# Patient Record
Sex: FEMALE | Race: WHITE | Employment: UNEMPLOYED | URBAN - METROPOLITAN AREA
[De-identification: names, ages, dates, MRNs, and addresses within clinical notes are randomized per-mention and may not be internally consistent; named-entity substitution may affect disease eponyms.]

---

## 2017-03-30 LAB
ANTIBODY SCREEN, EXTERNAL: NEGATIVE
GRBS, EXTERNAL: POSITIVE
HBSAG, EXTERNAL: NEGATIVE
HCT, EXTERNAL: 42.1
HGB, EXTERNAL: 13.8
HIV, EXTERNAL: NEGATIVE
RUBELLA, EXTERNAL: NORMAL
T. PALLIDUM, EXTERNAL: NORMAL

## 2017-07-20 LAB — GTT, 1 HR, GLUCOLA, EXTERNAL: 143

## 2017-10-16 ENCOUNTER — HOSPITAL ENCOUNTER (INPATIENT)
Age: 37
LOS: 2 days | Discharge: HOME | End: 2017-10-18
Attending: OBSTETRICS & GYNECOLOGY | Admitting: OBSTETRICS & GYNECOLOGY
Payer: COMMERCIAL

## 2017-10-16 ENCOUNTER — ANESTHESIA (OUTPATIENT)
Dept: LABOR AND DELIVERY | Age: 37
End: 2017-10-16
Payer: COMMERCIAL

## 2017-10-16 ENCOUNTER — ANESTHESIA EVENT (OUTPATIENT)
Dept: LABOR AND DELIVERY | Age: 37
End: 2017-10-16
Payer: COMMERCIAL

## 2017-10-16 LAB
ABO + RH BLD: NORMAL
BASOPHILS # BLD: 0 K/UL (ref 0–0.1)
BASOPHILS NFR BLD: 0 % (ref 0–1)
BLOOD GROUP ANTIBODIES SERPL: NORMAL
EOSINOPHIL # BLD: 0 K/UL (ref 0–0.4)
EOSINOPHIL NFR BLD: 0 % (ref 0–7)
ERYTHROCYTE [DISTWIDTH] IN BLOOD BY AUTOMATED COUNT: 13.5 % (ref 11.5–14.5)
HCT VFR BLD AUTO: 35.5 % (ref 35–47)
HGB BLD-MCNC: 11.5 G/DL (ref 11.5–16)
LYMPHOCYTES # BLD: 1.1 K/UL (ref 0.8–3.5)
LYMPHOCYTES NFR BLD: 13 % (ref 12–49)
MCH RBC QN AUTO: 30.1 PG (ref 26–34)
MCHC RBC AUTO-ENTMCNC: 32.4 G/DL (ref 30–36.5)
MCV RBC AUTO: 92.9 FL (ref 80–99)
MONOCYTES # BLD: 0.7 K/UL (ref 0–1)
MONOCYTES NFR BLD: 8 % (ref 5–13)
NEUTS SEG # BLD: 6.6 K/UL (ref 1.8–8)
NEUTS SEG NFR BLD: 79 % (ref 32–75)
PLATELET # BLD AUTO: 187 K/UL (ref 150–400)
RBC # BLD AUTO: 3.82 M/UL (ref 3.8–5.2)
SPECIMEN EXP DATE BLD: NORMAL
WBC # BLD AUTO: 8.3 K/UL (ref 3.6–11)

## 2017-10-16 PROCEDURE — 74011250636 HC RX REV CODE- 250/636: Performed by: OBSTETRICS & GYNECOLOGY

## 2017-10-16 PROCEDURE — 74011000250 HC RX REV CODE- 250

## 2017-10-16 PROCEDURE — 77030011640 HC PAD GRND REM COVD -A

## 2017-10-16 PROCEDURE — 77010026065 HC OXYGEN MINIMUM MEDICAL AIR: Performed by: OBSTETRICS & GYNECOLOGY

## 2017-10-16 PROCEDURE — 88307 TISSUE EXAM BY PATHOLOGIST: CPT | Performed by: OBSTETRICS & GYNECOLOGY

## 2017-10-16 PROCEDURE — 36415 COLL VENOUS BLD VENIPUNCTURE: CPT | Performed by: OBSTETRICS & GYNECOLOGY

## 2017-10-16 PROCEDURE — 86900 BLOOD TYPING SEROLOGIC ABO: CPT | Performed by: OBSTETRICS & GYNECOLOGY

## 2017-10-16 PROCEDURE — 77030007866 HC KT SPN ANES BBMI -B: Performed by: ANESTHESIOLOGY

## 2017-10-16 PROCEDURE — C1765 ADHESION BARRIER: HCPCS

## 2017-10-16 PROCEDURE — 76060000078 HC EPIDURAL ANESTHESIA: Performed by: OBSTETRICS & GYNECOLOGY

## 2017-10-16 PROCEDURE — 75410000003 HC RECOV DEL/VAG/CSECN EA 0.5 HR: Performed by: OBSTETRICS & GYNECOLOGY

## 2017-10-16 PROCEDURE — 77030018836 HC SOL IRR NACL ICUM -A

## 2017-10-16 PROCEDURE — 76010000391 HC C SECN FIRST 1 HR: Performed by: OBSTETRICS & GYNECOLOGY

## 2017-10-16 PROCEDURE — 77030034850

## 2017-10-16 PROCEDURE — 77030032490 HC SLV COMPR SCD KNE COVD -B

## 2017-10-16 PROCEDURE — 85025 COMPLETE CBC W/AUTO DIFF WBC: CPT | Performed by: OBSTETRICS & GYNECOLOGY

## 2017-10-16 PROCEDURE — 74011250636 HC RX REV CODE- 250/636

## 2017-10-16 PROCEDURE — 65270000029 HC RM PRIVATE

## 2017-10-16 PROCEDURE — 76010000392 HC C SECN EA ADDL 0.5 HR: Performed by: OBSTETRICS & GYNECOLOGY

## 2017-10-16 PROCEDURE — 74011250636 HC RX REV CODE- 250/636: Performed by: ANESTHESIOLOGY

## 2017-10-16 PROCEDURE — 77030010507 HC ADH SKN DERMBND J&J -B

## 2017-10-16 RX ORDER — SODIUM CHLORIDE, SODIUM LACTATE, POTASSIUM CHLORIDE, CALCIUM CHLORIDE 600; 310; 30; 20 MG/100ML; MG/100ML; MG/100ML; MG/100ML
1000 INJECTION, SOLUTION INTRAVENOUS CONTINUOUS
Status: DISCONTINUED | OUTPATIENT
Start: 2017-10-16 | End: 2017-10-16 | Stop reason: HOSPADM

## 2017-10-16 RX ORDER — IBUPROFEN 800 MG/1
800 TABLET ORAL EVERY 8 HOURS
Status: DISCONTINUED | OUTPATIENT
Start: 2017-10-16 | End: 2017-10-18 | Stop reason: HOSPADM

## 2017-10-16 RX ORDER — DIPHENHYDRAMINE HYDROCHLORIDE 50 MG/ML
12.5 INJECTION, SOLUTION INTRAMUSCULAR; INTRAVENOUS
Status: ACTIVE | OUTPATIENT
Start: 2017-10-16 | End: 2017-10-17

## 2017-10-16 RX ORDER — CEFAZOLIN SODIUM IN 0.9 % NACL 2 G/50 ML
2 INTRAVENOUS SOLUTION, PIGGYBACK (ML) INTRAVENOUS ONCE
Status: COMPLETED | OUTPATIENT
Start: 2017-10-16 | End: 2017-10-16

## 2017-10-16 RX ORDER — SIMETHICONE 80 MG
80 TABLET,CHEWABLE ORAL AS NEEDED
Status: DISCONTINUED | OUTPATIENT
Start: 2017-10-16 | End: 2017-10-18 | Stop reason: HOSPADM

## 2017-10-16 RX ORDER — OXYCODONE HYDROCHLORIDE 5 MG/1
5 TABLET ORAL
Status: ACTIVE | OUTPATIENT
Start: 2017-10-16 | End: 2017-10-17

## 2017-10-16 RX ORDER — SODIUM CHLORIDE, SODIUM LACTATE, POTASSIUM CHLORIDE, CALCIUM CHLORIDE 600; 310; 30; 20 MG/100ML; MG/100ML; MG/100ML; MG/100ML
125 INJECTION, SOLUTION INTRAVENOUS CONTINUOUS
Status: DISCONTINUED | OUTPATIENT
Start: 2017-10-16 | End: 2017-10-18 | Stop reason: HOSPADM

## 2017-10-16 RX ORDER — SODIUM CHLORIDE 0.9 % (FLUSH) 0.9 %
5-10 SYRINGE (ML) INJECTION AS NEEDED
Status: DISCONTINUED | OUTPATIENT
Start: 2017-10-16 | End: 2017-10-18 | Stop reason: HOSPADM

## 2017-10-16 RX ORDER — ZOLPIDEM TARTRATE 5 MG/1
5 TABLET ORAL
Status: DISCONTINUED | OUTPATIENT
Start: 2017-10-16 | End: 2017-10-18 | Stop reason: HOSPADM

## 2017-10-16 RX ORDER — OXYTOCIN/RINGER'S LACTATE 20/1000 ML
125-500 PLASTIC BAG, INJECTION (ML) INTRAVENOUS ONCE
Status: ACTIVE | OUTPATIENT
Start: 2017-10-16 | End: 2017-10-17

## 2017-10-16 RX ORDER — SODIUM CHLORIDE, SODIUM LACTATE, POTASSIUM CHLORIDE, CALCIUM CHLORIDE 600; 310; 30; 20 MG/100ML; MG/100ML; MG/100ML; MG/100ML
INJECTION, SOLUTION INTRAVENOUS
Status: DISCONTINUED | OUTPATIENT
Start: 2017-10-16 | End: 2017-10-16 | Stop reason: HOSPADM

## 2017-10-16 RX ORDER — KETOROLAC TROMETHAMINE 30 MG/ML
30 INJECTION, SOLUTION INTRAMUSCULAR; INTRAVENOUS
Status: DISCONTINUED | OUTPATIENT
Start: 2017-10-16 | End: 2017-10-16 | Stop reason: CLARIF

## 2017-10-16 RX ORDER — SODIUM CHLORIDE 0.9 % (FLUSH) 0.9 %
5-10 SYRINGE (ML) INJECTION EVERY 8 HOURS
Status: DISCONTINUED | OUTPATIENT
Start: 2017-10-16 | End: 2017-10-16 | Stop reason: HOSPADM

## 2017-10-16 RX ORDER — NALOXONE HYDROCHLORIDE 0.4 MG/ML
0.1 INJECTION, SOLUTION INTRAMUSCULAR; INTRAVENOUS; SUBCUTANEOUS
Status: DISCONTINUED | OUTPATIENT
Start: 2017-10-16 | End: 2017-10-18 | Stop reason: HOSPADM

## 2017-10-16 RX ORDER — OXYCODONE HYDROCHLORIDE 5 MG/1
10 TABLET ORAL
Status: ACTIVE | OUTPATIENT
Start: 2017-10-16 | End: 2017-10-17

## 2017-10-16 RX ORDER — MORPHINE SULFATE 0.5 MG/ML
INJECTION, SOLUTION EPIDURAL; INTRATHECAL; INTRAVENOUS AS NEEDED
Status: DISCONTINUED | OUTPATIENT
Start: 2017-10-16 | End: 2017-10-16 | Stop reason: HOSPADM

## 2017-10-16 RX ORDER — BISACODYL 5 MG
5 TABLET, DELAYED RELEASE (ENTERIC COATED) ORAL DAILY PRN
Status: DISCONTINUED | OUTPATIENT
Start: 2017-10-16 | End: 2017-10-18 | Stop reason: HOSPADM

## 2017-10-16 RX ORDER — HYDROCODONE BITARTRATE AND ACETAMINOPHEN 5; 325 MG/1; MG/1
1 TABLET ORAL
Status: DISCONTINUED | OUTPATIENT
Start: 2017-10-16 | End: 2017-10-16

## 2017-10-16 RX ORDER — SODIUM CHLORIDE 0.9 % (FLUSH) 0.9 %
5-10 SYRINGE (ML) INJECTION EVERY 8 HOURS
Status: DISCONTINUED | OUTPATIENT
Start: 2017-10-16 | End: 2017-10-18 | Stop reason: HOSPADM

## 2017-10-16 RX ORDER — OXYTOCIN 10 [USP'U]/ML
INJECTION, SOLUTION INTRAMUSCULAR; INTRAVENOUS AS NEEDED
Status: DISCONTINUED | OUTPATIENT
Start: 2017-10-16 | End: 2017-10-16 | Stop reason: HOSPADM

## 2017-10-16 RX ORDER — FENTANYL CITRATE 50 UG/ML
INJECTION, SOLUTION INTRAMUSCULAR; INTRAVENOUS AS NEEDED
Status: DISCONTINUED | OUTPATIENT
Start: 2017-10-16 | End: 2017-10-16 | Stop reason: HOSPADM

## 2017-10-16 RX ORDER — ONDANSETRON 2 MG/ML
INJECTION INTRAMUSCULAR; INTRAVENOUS AS NEEDED
Status: DISCONTINUED | OUTPATIENT
Start: 2017-10-16 | End: 2017-10-16 | Stop reason: HOSPADM

## 2017-10-16 RX ORDER — KETOROLAC TROMETHAMINE 30 MG/ML
30 INJECTION, SOLUTION INTRAMUSCULAR; INTRAVENOUS
Status: DISPENSED | OUTPATIENT
Start: 2017-10-16 | End: 2017-10-17

## 2017-10-16 RX ORDER — NALOXONE HYDROCHLORIDE 0.4 MG/ML
0.4 INJECTION, SOLUTION INTRAMUSCULAR; INTRAVENOUS; SUBCUTANEOUS AS NEEDED
Status: DISCONTINUED | OUTPATIENT
Start: 2017-10-16 | End: 2017-10-18 | Stop reason: HOSPADM

## 2017-10-16 RX ORDER — BUPIVACAINE HYDROCHLORIDE 7.5 MG/ML
INJECTION, SOLUTION EPIDURAL; RETROBULBAR AS NEEDED
Status: DISCONTINUED | OUTPATIENT
Start: 2017-10-16 | End: 2017-10-16 | Stop reason: HOSPADM

## 2017-10-16 RX ORDER — SODIUM CHLORIDE 0.9 % (FLUSH) 0.9 %
5-10 SYRINGE (ML) INJECTION AS NEEDED
Status: DISCONTINUED | OUTPATIENT
Start: 2017-10-16 | End: 2017-10-16 | Stop reason: HOSPADM

## 2017-10-16 RX ADMIN — SODIUM CHLORIDE, SODIUM LACTATE, POTASSIUM CHLORIDE, AND CALCIUM CHLORIDE 1000 ML: 600; 310; 30; 20 INJECTION, SOLUTION INTRAVENOUS at 12:16

## 2017-10-16 RX ADMIN — SODIUM CHLORIDE, SODIUM LACTATE, POTASSIUM CHLORIDE, AND CALCIUM CHLORIDE 125 ML/HR: 600; 310; 30; 20 INJECTION, SOLUTION INTRAVENOUS at 17:16

## 2017-10-16 RX ADMIN — OXYTOCIN 40 UNITS: 10 INJECTION, SOLUTION INTRAMUSCULAR; INTRAVENOUS at 14:59

## 2017-10-16 RX ADMIN — SODIUM CHLORIDE, SODIUM LACTATE, POTASSIUM CHLORIDE, CALCIUM CHLORIDE: 600; 310; 30; 20 INJECTION, SOLUTION INTRAVENOUS at 14:59

## 2017-10-16 RX ADMIN — ONDANSETRON 4 MG: 2 INJECTION INTRAMUSCULAR; INTRAVENOUS at 15:07

## 2017-10-16 RX ADMIN — CEFAZOLIN 2 G: 1 INJECTION, POWDER, FOR SOLUTION INTRAMUSCULAR; INTRAVENOUS; PARENTERAL at 13:18

## 2017-10-16 RX ADMIN — SODIUM CHLORIDE, SODIUM LACTATE, POTASSIUM CHLORIDE, AND CALCIUM CHLORIDE 1000 ML: 600; 310; 30; 20 INJECTION, SOLUTION INTRAVENOUS at 13:19

## 2017-10-16 RX ADMIN — KETOROLAC TROMETHAMINE 30 MG: 30 INJECTION, SOLUTION INTRAMUSCULAR at 17:15

## 2017-10-16 RX ADMIN — FENTANYL CITRATE 10 MCG: 50 INJECTION, SOLUTION INTRAMUSCULAR; INTRAVENOUS at 14:39

## 2017-10-16 RX ADMIN — BUPIVACAINE HYDROCHLORIDE 1.4 ML: 7.5 INJECTION, SOLUTION EPIDURAL; RETROBULBAR at 14:39

## 2017-10-16 RX ADMIN — MORPHINE SULFATE 200 MCG: 0.5 INJECTION, SOLUTION EPIDURAL; INTRATHECAL; INTRAVENOUS at 14:39

## 2017-10-16 NOTE — LACTATION NOTE
This note was copied from a baby's chart. Discussed with mother her plan for feeding. Reviewed the benefits of exclusive breast milk feeding during the hospital stay. Informed her of the risks of using formula to supplement in the first few days of life as well as the benefits of successful breast milk feeding; referred her to the Breastfeeding booklet about this information. She acknowledges understanding of information reviewed and states that it is her plan tobreast feedher infant. Will support her choice and offer additional information as needed. Reviewed breastfeeding basics:  How milk is made and normal  breastfeeding behaviors discussed. Supply and demand,  stomach size, early feeding cues, skin to skin bonding with comfortable positioning and baby led latch-on reviewed. How to identify signs of successful breastfeeding sessions reviewed; education on assymetrical latch, signs of effective latching vs shallow, in-effective latching, normal  feeding frequency and duration and expected infant output discussed. Normal course of breastfeeding discussed including the AAP's recommendation that children receive exclusive breast milk feedings for the first six months of life with breast milk feedings to continue through the first year of life and/or beyond as complimentary table foods are added. Breastfeeding Booklet and Warm line information provided with discussion. Discussed typical  weight loss and the importance of pediatrician appointment within 24-48 hours of discharge, at 2 weeks of life and normalcy of requesting pediatric weight checks as needed in between visits. Pt will successfully establish breastfeeding by feeding in response to early feeding cues   or wake every 3h, will obtain deep latch, and will keep log of feedings/output. Taught to BF at hunger cues and or q 2-3 hrs and to offer 10-20 drops of hand expressed colostrum at any non-feeds.       Breast Assessment  Left Breast: Medium, Thick Aereolae, Tight  Left Nipple: Everted, Intact  Right Breast: Medium, Large  Right Nipple: Everted, Intact  Breast- Feeding Assessment  Attends Breast-Feeding Classes: No  Breast-Feeding Experience: Yes  Breast Trauma/Surgery: No     Mother/Infant Observation  Mother Observation: Alignment, Breast comfortable  Infant Observation: Lips flanged, lower, Latches nipple and aereolae, Rhythmic suck, Relaxed after feeding, Opens mouth, Lips flanged, upper  LATCH Documentation  Latch: Repeated attempts, hold nipple in mouth, stimulate to suck  Audible Swallowing: A few with stimulation  Type of Nipple: Everted (after stimulation)  Comfort (Breast/Nipple): Soft/non-tender  Hold (Positioning): No assist from staff, mother able to position/hold infant  LATCH Score: 8

## 2017-10-16 NOTE — DISCHARGE INSTRUCTIONS
Discharge Instructions for  Section    Patient ID:  Annabelle Vega  [id number]  30 y.o.  1980    Take Home Medications     Percocet-Take 1 tablet by mouth every 4 hours as needed for pain. Continue taking your prenatal vitamins if you are breastfeeding. Follow-up Appointment:  Follow-up with [facility] in 2 weeks. Follow-up care is a key part of your treatment and safety. Be sure to make and go to all appointments, and call your doctor if you are having problems. Its also a good idea to know your test results and keep a list of the medicines you take. Activity  Avoid lifting more than 10 lbs for 6 weeks after your delivery. Don't put anything in your vagina for 6 weeks (no intercourse, tampons, or douching). Limit climbing stairs to twice a day, and please hold a railing. Have someone else carry your baby up stairs initially, as you may be a bit unsteady. No driving for about 2 weeks or until your are easily able to turn your body and move your foot from the gas to the brake pedal without pain- you need to be able to move quickly enough to respond to traffic. You cannot drive while you are taking narcotics (prescription pain medications). You may shower but do not take a bath for 2 weeks. Be sure to dry your incision well after your shower. You may gradually work up to light exercise such as brisk walking over the next few weeks, but take it easy- you'll be tired and sore! You need to wait 4-6 weeks before starting vigorous exercise such as aerobics, running, weight-lifting, sit-ups, etc.- clear this with your doctor at your postpartum check-up. Although it's important to limit certain activities and avoid \"over-doing it\", walking is good for you and will actually speed the healing process. Walking increases the blood flow to your legs, decreasing the risk of blood clots, and also encourages the bowels to speed up, decreasing constipation, bloating, and gassiness.   Don't stay in bed at home, get up and move around the house. You'll feel better more quickly. Diet  You may eat a regular diet but you may want to avoid heavy, greasy or spicy foods and other foods that could increase constipation and gas. Wound care  Your incision may have been closed with conventional (metal) staples, absorbable (dissolving) staples, or absorbable sutures. If you still have staples in your incision when you leave the hospital, call your doctor's office as instructed to schedule an appointment to have them removed (usually in about a week). Otherwise, there may be some small tapes over your incision called Steri-strips. Please remove these in about a week. There also may be Dermabond glue over your incision which looks like a clear, shiny coating (as if it were painted with clear fingernail polish). This will gradually peel off over the following weeks, do not remove it. It is normal to have a small amount of clear or reddish drainage from your incision. It's best to leave it open to the air, but if there is drainage, you may cover the incision lightly with gauze, preferably without tape. Keep the incision as dry as possible. You may even consider drying the incision with a cool hair dryer after you shower. Numbness of the skin at or around your incision is normal and the feeling usually returns gradually. Call your doctor if you have a lot of drainage from your incision, an unpleasant odor, red streaks, an increase in pain, or the incision appears to open up. Pain Management  You were probably given a prescription for pain medication, similar to the one you've been taking while in the hospital.  In addition to this, you can take over-the-counter pain medicines like Advil or Motrin (ibuprofen). You can take both medications together, alternating doses every few hours.   You will get the most relief if you take the maximum dose:  Motrin or Advil (generic ibuprofen) 800 mg every 8 hours (4 tablets or capsules every 8 hours). The prescription you were given probably contains a narcotic mixed with Tylenol, therefore, you should not take any extra Tylenol or acetominophen, or you could be getting more than the safe amount. If you feel you don't need the prescription pain medication, you may take Tylenol instead, along with ibuprofen. The maximum dose is Tylenol or acetominophen 1000 mg every 6 hours (equivalent to 2 Extra Strength Tylenols every 6 hours). After a few days, you should reduce the amount of prescription pain medication you are taking. At that point, try to use ibuprofen as the main medication, and take the prescription medication if needed for more severe pain not relieved by the ibuprofen. Your goal should be to take only the minimum necessary amounts of the prescription medication (narcotic), as these pain medicines can be addictive and will worsen or cause constipation. Most patients will find that within a few days, their pain is adequately controlled using only over-the-counter medications and minimal doses of prescription pain medicine. A heating pad can also be very helpful for cramping or back pain. Sitz baths are helpful for pain associated with hemorrhoids. You can use either a sitz bath basin or a bathtub filled with 2-3\" inches of plain warm water. Soak for 10 minutes 3 times a day. Over-the-counter hemorrhoid wipes and ointments are fine to use as well. Constipation  You may find that bowel movements are irregular after delivery and that you have a tendency to be constipated, especially after surgery. A stool softener such as Colace (docusate) can safely be taken daily if needed. If you become constipated you can use a laxative such as Dulcolax, Miralax or Milk of Magnesia. Don't wait until constipation is severe- take something sooner rather than later and you will feel much better! Suppositories such as Dulcolax or Fleet's Enemas are options too.     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 Try to rest when you can and don't worry about doing housework or other tasks which can wait. The soreness in your incision will improve significantly over the first 2 weeks or so, but it may take 6-8 weeks before you are completely recovered. You are likely to have some back pain or general body aches or muscle soreness. This should improve with acetominophen or ibuprofen. If your legs were swollen during your pregnancy or as a result of IV fluids given during your hospital stay, this should go away in a few days to a week. Most women experience some form of the \"Baby Blues\" after having a baby. This means that you may feel emotional, tearful, frustrated, anxious, sad, and irritable some of the time. This is normal if it's not severe and should go away after about 2 weeks. Getting as much rest as you can will help. Accept assistance from friends and family members so that you can take breaks from caring for the baby. Call your doctor if your symptoms seem severe, last more than 2 weeks, or seem to be getting worse instead of better. Get help immediately if you have thoughts of wanting to hurt yourself or others! When should you call for help? Call 911 anytime you think you may need emergency care. For example, call if:  You pass out (lose consciousness). You have sudden chest pain and shortness of breath, or you cough up blood. You have severe pain in your belly. Call your doctor now or seek immediate medical care if:  You have heavy vaginal bleeding that soaks one or more pads in an hour, or you have large clots (golf ball size or larger). Your have foul-smelling discharge from your vagina or incision. You are sick to your stomach or cannot keep fluids down.   You have pain that does not get better after you take pain medicine. You have signs of infection, such as: Increased pain in your abdomen or vaginal area. Red streaks, warmth, or tenderness of your breasts or the area around your incision. A fever of 101 or greater (taken by mouth). You have signs of a blood clot, such as:  Pain in your calf, back of knee, thigh, or groin. Redness and swelling in your leg or groin. You have trouble passing urine or stool, especially if you have pain or swelling in your lower belly; or if you are unable to have a bowel movement after taking a laxative. You have a fast or pounding heartbeat. MyChart Activation    Thank you for requesting access to [facility]. Please follow the instructions below to securely access and download your online medical record. [facility] allows you to send messages to your doctor, view your test results, renew your prescriptions, schedule appointments, and more. How Do I Sign Up? 1. In your internet browser, go to [email or web address]  2. Click on the First Time User? Click Here link in the Sign In box. You will be redirect to the New Member Sign Up page. 3. Enter your [facility] Access Code exactly as it appears below. You will not need to use this code after youve completed the sign-up process. If you do not sign up before the expiration date, you must request a new code. [facility] Access Code: [email or web address]  Expires: 2018 10:26 AM (This is the date your [employer] access code will )    4. Enter the last four digits of your Social Security Number (xxxx) and Date of Birth (mm/dd/yyyy) as indicated and click Submit. You will be taken to the next sign-up page. 5. Create a [facility] ID. This will be your [facility] login ID and cannot be changed, so think of one that is secure and easy to remember. 6. Create a [facility] password. You can change your password at any time. 7. Enter your Password Reset Question and Answer. This can be used at a later time if you forget your password. 8. Enter your e-mail address. You will receive e-mail notification when new information is available in [address] 9. Click Sign Up. You can now view and download portions of your medical record. 10. Click the Download Summary menu link to download a portable copy of your medical information. Additional Information    If you have questions, please visit the Frequently Asked Questions section of the [employer] website at [email or web address] Remember, [facility] is NOT to be used for urgent needs. For medical emergencies, dial 911.

## 2017-10-16 NOTE — IP AVS SNAPSHOT
Denilson Kim 
 
 
 [address] [facility] 
[phone] Patient: Balwinder Roberson MRN: [id number] VQR:3/2/6116 You are allergic to the following No active allergies Recent Documentation Height Weight Breastfeeding? BMI OB Status Smoking Status 1.6 m 91.6 kg Unknown 35.78 kg/m2 Recent pregnancy Never Smoker Emergency Contacts Name Discharge Info Relation Home Work Mobile Abelardo Sharma DISCHARGE CAREGIVER [3] Spouse [3] [phone] About your hospitalization You were admitted on:  October 16, 2017 You last received care in the:  [facility] You were discharged on:  October 18, 2017 Unit phone number:  [phone] Why you were hospitalized Your primary diagnosis was:  Not on File Your diagnoses also included:  Pregnancy Providers Seen During Your Hospitalizations Provider Role Specialty Primary office phone Bharat Miller MD Attending Provider Obstetrics & Gynecology [phone] Ruddy Vargas MD Attending Provider Obstetrics & Gynecology [phone] Your Primary Care Physician (PCP) Primary Care Physician Office Phone Office Fax Karina Pascual [phone] Follow-up Information Follow up With Details Comments Contact Info Glenna Seth, [address] [facility] 
[phone] Current Discharge Medication List  
  
START taking these medications Dose & Instructions Dispensing Information Comments Morning Noon Evening Bedtime  
 oxyCODONE-acetaminophen 5-325 mg per tablet Commonly known as:  PERCOCET Your last dose was: Your next dose is:    
   
   
 Dose:  1 Tab Take 1 Tab by mouth every four (4) hours as needed. Max Daily Amount: 6 Tabs. Quantity:  30 Tab Refills:  0 CONTINUE these medications which have NOT CHANGED Dose & Instructions Dispensing Information Comments Morning Noon Evening Bedtime  
 docusate sodium 100 mg capsule Commonly known as:  Alex El Your last dose was: Your next dose is:    
   
   
 Dose:  100 mg Take 100 mg by mouth two (2) times a day. Refills:  0 FLINTSTONES COMPLETE (IRON) chewable tablet Generic drug:  flintstones complete Your last dose was: Your next dose is:    
   
   
 Dose:  1 Tab Take 1 Tab by mouth daily. Refills:  0  
     
   
   
   
  
 ibuprofen 800 mg tablet Commonly known as:  MOTRIN Your last dose was: Your next dose is:    
   
   
 Dose:  800 mg Take 1 Tab by mouth every six (6) hours as needed for Pain. Quantity:  30 Tab Refills:  1 STOP taking these medications HYDROcodone-acetaminophen 5-325 mg per tablet Commonly known as:  Rosamaria Arts Where to Get Your Medications Information on where to get these meds will be given to you by the nurse or doctor. ! Ask your nurse or doctor about these medications  
  oxyCODONE-acetaminophen 5-325 mg per tablet Discharge Instructions Discharge Instructions for  Section Patient ID: 
Lenny Galeano 
[id number] 
65 y.o. 
1980 Take Home Medications Percocet-Take 1 tablet by mouth every 4 hours as needed for pain. Continue taking your prenatal vitamins if you are breastfeeding. Follow-up Appointment: 
Follow-up with [facility] in 2 weeks. Follow-up care is a key part of your treatment and safety. Be sure to make and go to all appointments, and call your doctor if you are having problems. Its also a good idea to know your test results and keep a list of the medicines you take. Activity Avoid lifting more than 10 lbs for 6 weeks after your delivery.   Don't put anything in your vagina for 6 weeks (no intercourse, tampons, or douching). Limit climbing stairs to twice a day, and please hold a railing. Have someone else carry your baby up stairs initially, as you may be a bit unsteady. No driving for about 2 weeks or until your are easily able to turn your body and move your foot from the gas to the brake pedal without pain- you need to be able to move quickly enough to respond to traffic. You cannot drive while you are taking narcotics (prescription pain medications). You may shower but do not take a bath for 2 weeks. Be sure to dry your incision well after your shower. You may gradually work up to light exercise such as brisk walking over the next few weeks, but take it easy- you'll be tired and sore! You need to wait 4-6 weeks before starting vigorous exercise such as aerobics, running, weight-lifting, sit-ups, etc.- clear this with your doctor at your postpartum check-up. Although it's important to limit certain activities and avoid \"over-doing it\", walking is good for you and will actually speed the healing process. Walking increases the blood flow to your legs, decreasing the risk of blood clots, and also encourages the bowels to speed up, decreasing constipation, bloating, and gassiness. Don't stay in bed at home, get up and move around the house. You'll feel better more quickly. Diet You may eat a regular diet but you may want to avoid heavy, greasy or spicy foods and other foods that could increase constipation and gas. Wound care Your incision may have been closed with conventional (metal) staples, absorbable (dissolving) staples, or absorbable sutures. If you still have staples in your incision when you leave the hospital, call your doctor's office as instructed to schedule an appointment to have them removed (usually in about a week).   Otherwise, there may be some small tapes over your incision called Steri-strips. Please remove these in about a week. There also may be Dermabond glue over your incision which looks like a clear, shiny coating (as if it were painted with clear fingernail polish). This will gradually peel off over the following weeks, do not remove it. It is normal to have a small amount of clear or reddish drainage from your incision. It's best to leave it open to the air, but if there is drainage, you may cover the incision lightly with gauze, preferably without tape. Keep the incision as dry as possible. You may even consider drying the incision with a cool hair dryer after you shower. Numbness of the skin at or around your incision is normal and the feeling usually returns gradually. Call your doctor if you have a lot of drainage from your incision, an unpleasant odor, red streaks, an increase in pain, or the incision appears to open up. Pain Management You were probably given a prescription for pain medication, similar to the one you've been taking while in the hospital.  In addition to this, you can take over-the-counter pain medicines like Advil or Motrin (ibuprofen). You can take both medications together, alternating doses every few hours. You will get the most relief if you take the maximum dose:  Motrin or Advil (generic ibuprofen) 800 mg every 8 hours (4 tablets or capsules every 8 hours). The prescription you were given probably contains a narcotic mixed with Tylenol, therefore, you should not take any extra Tylenol or acetominophen, or you could be getting more than the safe amount. If you feel you don't need the prescription pain medication, you may take Tylenol instead, along with ibuprofen. The maximum dose is Tylenol or acetominophen 1000 mg every 6 hours (equivalent to 2 Extra Strength Tylenols every 6 hours). After a few days, you should reduce the amount of prescription pain medication you are taking.   At that point, try to use ibuprofen as the main medication, and take the prescription medication if needed for more severe pain not relieved by the ibuprofen. Your goal should be to take only the minimum necessary amounts of the prescription medication (narcotic), as these pain medicines can be addictive and will worsen or cause constipation. Most patients will find that within a few days, their pain is adequately controlled using only over-the-counter medications and minimal doses of prescription pain medicine. A heating pad can also be very helpful for cramping or back pain. Sitz baths are helpful for pain associated with hemorrhoids. You can use either a sitz bath basin or a bathtub filled with 2-3\" inches of plain warm water. Soak for 10 minutes 3 times a day. Over-the-counter hemorrhoid wipes and ointments are fine to use as well. Constipation You may find that bowel movements are irregular after delivery and that you have a tendency to be constipated, especially after surgery. A stool softener such as Colace (docusate) can safely be taken daily if needed. If you become constipated you can use a laxative such as Dulcolax, Miralax or Milk of Magnesia. Don't wait until constipation is severe- take something sooner rather than later and you will feel much better! Suppositories such as Dulcolax or Fleet's Enemas are options too. Your Recovery: What to Expect at [facility] Delivering a baby is hard work and you probably aren't getting much sleep, so you will certainly be tired, especially after having a  section. Try to rest when you can and don't worry about doing housework or other tasks which can wait. The soreness in your incision will improve significantly over the first 2 weeks or so, but it may take 6-8 weeks before you are completely recovered. You are likely to have some back pain or general body aches or muscle soreness. This should improve with acetominophen or ibuprofen. If your legs were swollen during your pregnancy or as a result of IV fluids given during your hospital stay, this should go away in a few days to a week. Most women experience some form of the \"Baby Blues\" after having a baby. This means that you may feel emotional, tearful, frustrated, anxious, sad, and irritable some of the time. This is normal if it's not severe and should go away after about 2 weeks. Getting as much rest as you can will help. Accept assistance from friends and family members so that you can take breaks from caring for the baby. Call your doctor if your symptoms seem severe, last more than 2 weeks, or seem to be getting worse instead of better. Get help immediately if you have thoughts of wanting to hurt yourself or others! When should you call for help? Call 911 anytime you think you may need emergency care. For example, call if: You pass out (lose consciousness). You have sudden chest pain and shortness of breath, or you cough up blood. You have severe pain in your belly. Call your doctor now or seek immediate medical care if: 
You have heavy vaginal bleeding that soaks one or more pads in an hour, or you have large clots (golf ball size or larger). Your have foul-smelling discharge from your vagina or incision. You are sick to your stomach or cannot keep fluids down. You have pain that does not get better after you take pain medicine. You have signs of infection, such as: Increased pain in your abdomen or vaginal area. Red streaks, warmth, or tenderness of your breasts or the area around your incision. A fever of 101 or greater (taken by mouth). You have signs of a blood clot, such as: 
Pain in your calf, back of knee, thigh, or groin. Redness and swelling in your leg or groin. You have trouble passing urine or stool, especially if you have pain or swelling in your lower belly; or if you are unable to have a bowel movement after taking a laxative. You have a fast or pounding heartbeat. MyChart Activation Thank you for requesting access to [email or web address]. Please follow the instructions below to securely access and download your online medical record. [email or web address] allows you to send messages to your doctor, view your test results, renew your prescriptions, schedule appointments, and more. How Do I Sign Up? 1. In your internet browser, go to [email or web address] 
2. Click on the First Time User? Click Here link in the Sign In box. You will be redirect to the New Member Sign Up page. 3. Enter your [email or web address] Access Code exactly as it appears below. You will not need to use this code after youve completed the sign-up process. If you do not sign up before the expiration date, you must request a new code. [email or web address] Access Code: [email or web address] Expires: 2018 10:26 AM (This is the date your [email or web address] access code will ) 4. Enter the last four digits of your Social Security Number (xxxx) and Date of Birth (mm/dd/yyyy) as indicated and click Submit. You will be taken to the next sign-up page. 5. Create a [email or web address] ID. This will be your [email or web address] login ID and cannot be changed, so think of one that is secure and easy to remember. 6. Create a [email or web address] password. You can change your password at any time. 7. Enter your Password Reset Question and Answer. This can be used at a later time if you forget your password. 8. Enter your e-mail address. You will receive e-mail notification when new information is available in [address] 9. Click Sign Up. You can now view and download portions of your medical record. 10. Click the Download Summary menu link to download a portable copy of your medical information. Additional Information If you have questions, please visit the Frequently Asked Questions section of the [email or web address] website at [email or web address] Remember, [email or web address] is NOT to be used for urgent needs.  For medical 10. Click the Download Summary menu link to download a portable copy of your medical information. If you have questions, please visit the Frequently Asked Questions section of the [employer] website. Remember, MyChart is NOT to be used for urgent needs. For medical emergencies, dial 911. Now available from your iPhone and Android! General Information Please provide this summary of care documentation to your next provider. Patient Signature:  ____________________________________________________________ Date:  ____________________________________________________________  
  
Aloma Snellen Provider Signature:  ____________________________________________________________ Date:  ____________________________________________________________

## 2017-10-16 NOTE — ANESTHESIA PREPROCEDURE EVALUATION
Anesthetic History   No history of anesthetic complications            Review of Systems / Medical History  Patient summary reviewed, nursing notes reviewed and pertinent labs reviewed    Pulmonary  Within defined limits                 Neuro/Psych   Within defined limits           Cardiovascular  Within defined limits                Exercise tolerance: >4 METS  Comments: Not on beta blocker   GI/Hepatic/Renal  Within defined limits              Endo/Other  Within defined limits           Other Findings              Physical Exam    Airway  Mallampati: II  TM Distance: 4 - 6 cm  Neck ROM: normal range of motion   Mouth opening: Normal     Cardiovascular  Regular rate and rhythm,  S1 and S2 normal,  no murmur, click, rub, or gallop  Rhythm: regular  Rate: normal         Dental  No notable dental hx       Pulmonary  Breath sounds clear to auscultation               Abdominal  GI exam deferred       Other Findings            Anesthetic Plan    ASA: 2  Anesthesia type: spinal      Post-op pain plan if not by surgeon: intrathecal opiates      Anesthetic plan and risks discussed with: Patient

## 2017-10-16 NOTE — ROUTINE PROCESS
Bedside and Verbal shift change report given to  CATARINA Hatch RN (oncoming nurse) by Lorena Becerra RN (offgoing nurse). Report included the following information SBAR, Kardex, Intake/Output, MAR and Recent Results.

## 2017-10-16 NOTE — PROGRESS NOTES
26 - Spoke with Dr. Frank Dooley, updated on patient GBS positive status, TORB to give patient 2g ancef dose now. 1800 - TRANSFER - OUT REPORT:    Verbal report given to Zandra Zabala RN (name) on Marissa Gutierrez  being transferred to MIU (unit) for routine progression of care       Report consisted of patients Situation, Background, Assessment and   Recommendations(SBAR). Information from the following report(s) SBAR, OR Summary, Procedure Summary, Intake/Output, MAR, Recent Results and Med Rec Status was reviewed with the receiving nurse. Lines:   Peripheral IV 09/18/13 Right Hand (Active)       Peripheral IV 10/16/17 Left Hand (Active)   Site Assessment Clean, dry, & intact 10/16/2017 12:16 PM   Phlebitis Assessment 0 10/16/2017 12:16 PM   Infiltration Assessment 0 10/16/2017 12:16 PM   Dressing Status Clean, dry, & intact 10/16/2017 12:16 PM   Dressing Type Tape;Transparent 10/16/2017 12:16 PM   Hub Color/Line Status Pink 10/16/2017 12:16 PM        Opportunity for questions and clarification was provided.       Patient transported with:   Registered Nurse   Infant in [address] [place] [address]

## 2017-10-16 NOTE — DISCHARGE SUMMARY
Patient ID:  Malathi Arellano  [id number]  40 y.o.  1980    Admit Date: 10/16/2017    Discharge Date: 10/18/17     Admitting Physician: Jenny Lee MD    Attending Physician: Tere Cardenas MD    Admission Diagnoses: Repeat  Pregnancy    Procedures for this admission: Procedure(s):   SECTION    Discharge Diagnoses: Same as above with LFCS producing a viable infant. Information for the patient's :  Jacklyn Cortes, Female [[id number]]   One Minute Apgar: 5 (Filed from Delivery Summary)  Five  Minute Apgar: 9 (Filed from Delivery Summary)        Discharge Disposition:  home    Discharge Condition:  stable    Additional Diagnoses: prev cs x 2 . Maternal Labs:   Lab Results   Component Value Date/Time    HBsAg, External negative 2017    HIV, External negative 2017    Rubella, External immune 2017    RPR, External NR 2015    GrBStrep, External positive 2017       Cord Blood Results:   Information for the patient's :  Jacklyn Cortes, Female [[id number]]   No results found for: PCTABR, [address], PCTDIG, BILI, ABORH, ABORHEXT          History of Present Illness:   OB History     This patient's OB History needs to be verified. Open OB History to review and resolve any issues.  Para Term  AB Living    4 2 2   2    SAB TAB Ectopic Molar Multiple Live Births        0 2        Obstetric Comments    Molar pregnancy by US 2013        Admitted for  Section. Hospital Course:   Patient was admitted as above and delivered via LFCS . Please the chart for details. The postpartum course was unremarkable. She was deemed stable for discharge home on day 2.     Follow-up Care: 2 w        Signed:  Tere Cardenas MD  10/16/2017  3:49 PM

## 2017-10-16 NOTE — ANESTHESIA PROCEDURE NOTES
Spinal Block    Start time: 10/16/2017 2:30 PM  End time: 10/16/2017 2:39 PM  Performed by: Almas Barriga  Authorized by: Almas Barriga     Pre-procedure:   Indications: primary anesthetic  Preanesthetic Checklist: patient identified, risks and benefits discussed, anesthesia consent, site marked, patient being monitored and timeout performed    Timeout Time: 14:32          Spinal Block:   Patient Position:  Seated  Prep Region:  Lumbar  Prep: Betadine and DuraPrep      Location:  L2-3  Technique:  Single shot  Local:  Lidocaine 1%  Local Dose (mL):  3    Needle:   Needle Type:  Pencan  Needle Gauge:  25 G  Attempts:  1      Events: CSF confirmed, no blood with aspiration and no paresthesia        Assessment:  Insertion:  Uncomplicated  Patient tolerance:  Patient tolerated the procedure well with no immediate complications

## 2017-10-16 NOTE — H&P
History & Physical    Name: Geneva Gray MRN: [id number]  SSN: [id number]    YOB: 1980  Age: 30 y.o. Sex: female      Subjective:     Estimated Date of Delivery: 17  OB History     This patient's OB History needs to be verified. Open OB History to review and resolve any issues.  Para Term  AB Living    4 1 1   1    SAB TAB Ectopic Molar Multiple Live Births        0 1        Obstetric Comments    Molar pregnancy by US 2013          Ms. Sharma admitted with pregnancy at 37w5d for  section due to previous  section and prom. Prenatal course was complicated by 2 prior cs h/o mole. Please see prenatal records for details. Past Medical History:   Diagnosis Date    Acne     Nerve damage     right hand     Past Surgical History:   Procedure Laterality Date     DELIVERY ONLY      HX  SECTION          HX DILATION AND CURETTAGE      molar pregnancy    HX HEENT  2009    left eye strabimus     Social History     Occupational History    Not on file. Social History Main Topics    Smoking status: Never Smoker    Smokeless tobacco: Never Used    Alcohol use No    Drug use: No    Sexual activity: Yes     Partners: Male     Birth control/ protection: Condom     Family History   Problem Relation Age of Onset    Hypertension Father        No Known Allergies  Prior to Admission medications    Medication Sig Start Date End Date Taking? Authorizing Provider   flintstones complete (FLINTSTONES COMPLETE) chewable tablet Take 1 Tab by mouth daily. Yes Historical Provider   HYDROcodone-acetaminophen (NORCO) 5-325 mg per tablet Take 1 Tab by mouth every six (6) hours as needed. Max Daily Amount: 4 Tabs. 12/12/15   Tara Mcgarry MD   ibuprofen (MOTRIN) 800 mg tablet Take 1 Tab by mouth every six (6) hours as needed for Pain.  12/12/15   Tara Mcgarry MD   docusate sodium (COLACE) 100 mg capsule Take 100 mg by mouth two (2) times a day. Historical Provider        Review of Systems: A comprehensive review of systems was negative except for that written in the History of Present Illness. Objective:     Vitals:  Vitals:    10/16/17 1200 10/16/17 1226   BP: 122/78    Pulse: (!) 112    Weight:  91.6 kg (202 lb)   Height:  5' 3\" (1.6 m)        Physical Exam:  Patient without distress. Abdomen: soft, nontender  Fundus: soft and non tender  Perineum: blood absent, amniotic fluid present  Cervical Exam: 3 cm dilated    50% effaced    -2 station    Presenting Part: cephalic  Cervical Position: posterior  Consistency: Medium  Lower Extremities:  - Edema 2+  Estimated Fetal Weight: 7lbs 5oz   Membranes:  Spontaneous Rupture of Membranes; Amniotic Fluid: clear fluid ? Since midday yesterday  Fetal Heart Rate: Reactive    Prenatal Labs:   Lab Results   Component Value Date/Time    Rubella, External Immune 2015    GrBStrep, External POSITIVE 2015    HBsAg, External Negative 2015    HIV, External Negative 2015    RPR, External NR 2015    Gonorrhea, External NEG 2013    Chlamydia, External NEG 2013        Impression/Plan:     Plan:  Admit for  section. Group B Strep was positive, will treat prophylactically with ancef. Discussed the risks of surgery including the risks of bleeding, infection, deep vein thrombosis, and surgical injuries to internal organs including but not limited to the bowels, bladder, rectum, and female reproductive organs. The patient understands the risks; any and all questions were answered to the patient's satisfaction.     Signed By:  Arlette Linares MD     2017

## 2017-10-16 NOTE — L&D DELIVERY NOTE
Delivery Summary    Patient: Cosmo Little MRN: [id number]  SSN: [id number]    YOB: 1980  Age: 30 y.o. Sex: female        Labor Events:    Labor: No    Rupture Date: 10/16/2017    Rupture Time: 2:58 PM    Rupture Type:      Amniotic Fluid Volume:      Amniotic Fluid Description:  Amniotic fluid Odor: Clear    None     Induction: None        Induction Date:        Induction Time:       Indications for Induction:       Augmentation: None    Augmentation Date:      Augmentation Time:      Indications for Augmentation:      Events:  prolonged rupture    Cervical Ripening:       None    Rupture Identifier: Rupture 1     Labor complications: Other (comment)     Additional complications:         Delivery Events:  Estimated Blood Loss (ml): 600      Information for the patient's :  Joy Yi Female [[id number]]     Delivery Summary - Baby    Delivery Date: 10/16/2017  Delivery Time: 2:58 PM  Delivery Type: , Low Transverse    Section Delivery:     Sex:  female     Gestational Age: 37w6d  Delivery Clinician:  Jodi Monroy   Living?: Living  Delivery Location: OR           APGARS  One minute Five minutes Ten minutes   Skin color: 1   1        Heart rate: 2   2        Grimace: 2   2        Muscle tone: 2   2        Breathin   2        Totals: 9   9           Presentation: Vertex    Position:        Resuscitation Method:  Suctioning-bulb; Tactile Stimulation     Meconium Stained: None      Cord Information: 3 Vessels   Complications: None  Cord Blood Sent?:  Yes    Blood Gases Sent?:  No    Placenta:  Date/Time: 10/16  2:59 PM  Removal: Manual Removal      Appearance: Normal      Measurements:  Birth Weight: 2.945 kg      Birth Length: 47.6 cm      Head Circumference: 33 cm      Chest Circumference: 30.5 cm     Abdominal Girth: 30 cm    Other Providers:   OMERO SANTIZO;ARIANA KINNEY;SARAI NIEVES;SHELLY LIAO;BLAYNE ANNA;Juan Carlos SOLER Obstetrician;Primary Nurse;Primary  Nurse;Crna;Surgeon Assistant; Tech           Group Beta Strep:   Lab Results   Component Value Date/Time    GrBStrep, External positive 2017        Cord Blood Results:  Information for the patient's :  Rene Briggs, Female [[id number]]   No results found for: Myrl Course, PCTDIG, BILI, ABORHEXT, ABORH    Information for the patient's :  Rene Briggs, Female [[id number]]   No results found for: APH, APCO2, APO2, AHCO3, ABEC, ABDC, O2ST, SITE, [place], PHI, [place], PO2I, HCO3I, SO2I, IBD    Information for the patient's newbornAnge Helm, Female [[id number]]   No results found for: EPHV, PCO2V, PO2V, HCO3V, O2STV, EBDV

## 2017-10-16 NOTE — OP NOTES
[facility]   [address] [facility], [address]   OP NOTE       Name:  Ifeoma Bateman   MR#:  [id number]   :  1980   Account #:  [de-identified]    Surgery Date:  10/16/2017   Date of Adm:  10/16/2017       PREOPERATIVE DIAGNOSES    1. Intrauterine pregnancy at 37+ weeks' gestation. 2. Previous  section x2.   3. Spontaneous rupture of membranes. POSTOPERATIVE DIAGNOSES    1. Intrauterine pregnancy at 37+ weeks' gestation. 2. Previous  section x2.   3. Spontaneous rupture of membranes. PROCEDURE PERFORMED: Repeat low transverse . SURGEON: Esvin Thomas MD    ANESTHESIA: Spinal by Dr. Nicki Scott     CRNA    1. Ms. Shawn Sen. 2. Ms. Mame Delatorre. ESTIMATED BLOOD LOSS: 600      INFORMED CONSENT: The patient was advised of risks, benefits,   and alternatives of the procedure, risks including those of bleeding,   infection, injury to surrounding structures. All questions were   answered, and the procedure was done. DESCRIPTION OF PROCEDURE: A time-out was done prior to the   start of the procedure. She received her IV antibiotics. She was   prepped and draped and had an indwelling Shannon catheter and SCDs   on her extremities. She was tested and then a Pfannenstiel skin   incision was made on the prior incision. There was some scar tissue   that was divided transversely down to the fascia, which was also   divided transversely with scissors and  from the underlying   muscle with sharp dissection superiorly and inferiorly. An opening was   made in the peritoneal cavity high up and then expanded digitally and   also with some sharp dissection, making sure not to enter the bladder   area. She had some omental adhesions to the anterior abdominal wall,   which were taken down with electrocautery and hemostasis achieved   and then the bladder flap was seen and was taken down with   Metzenbaum scissors.  A low transverse uterine incision was made at   the junction with the upper segment. The lower segment was   extremely thin and was enhanced digitally and the baby was delivered   in cephalic presentation with gentle fundal pressure. Clear fluid was   draining. The cord was clamped and cut after 1 minute. [de-identified] sex is   female. Apgars 9 at 1 minute and 9 at 5 minutes, respectively, and   weight is 6 pounds 8 ounces. IV Pitocin was running. Placenta was   delivered spontaneously. The uterus was massaged for contractility. The uterus was exteriorized and wrapped with a moist lap and then the   edges of the uterine incision were held with Allis-Chapito forceps and it   was closed with a continuous locking suture of 0 chromic catgut and   hemostasis achieved. Tubes and ovaries were normal. The uterus was   placed back in the peritoneal cavity. Incision was inspected and found   to be hemostatic and Seprafilm placed on it as an adhesive barrier. Edges of the peritoneum were held with Kathy clamps and closed with a   continuous nonlocking suture of 0 Vicryl. Fascia was closed in 2   halves, each half with a continuous nonlocking suture of 0 Vicryl, and it   was inspected suprafascially and subfascially as well. Subcutaneous   fatty tissue was approximated using 3-0 Vicryl continuous nonlocking,   and skin approximated using subcuticular 3-0 Vicryl and Dermabond   applied. After it dried, a pressure dressing was applied. Estimated   blood loss was about 600 mL. There were no complications. Sponge   count and instrument counts were correct. Urine was clear. The patient   was taken to recovery room in stable condition at the end of the   operative procedure.         MD ARCHANA Graham / VAL   D:  10/16/2017   15:54   T:  10/16/2017   16:29   Job #:  [id number]

## 2017-10-16 NOTE — IP AVS SNAPSHOT
[address] 
[phone] Patient: Karol Dillon MRN: [id number] IGN:6/9/0742 Current Discharge Medication List  
  
START taking these medications Dose & Instructions Dispensing Information Comments Morning Noon Evening Bedtime  
 oxyCODONE-acetaminophen 5-325 mg per tablet Commonly known as:  PERCOCET Your last dose was: Your next dose is:    
   
   
 Dose:  1 Tab Take 1 Tab by mouth every four (4) hours as needed. Max Daily Amount: 6 Tabs. Quantity:  30 Tab Refills:  0 CONTINUE these medications which have NOT CHANGED Dose & Instructions Dispensing Information Comments Morning Noon Evening Bedtime  
 docusate sodium 100 mg capsule Commonly known as:  Vianey Mule Your last dose was: Your next dose is:    
   
   
 Dose:  100 mg Take 100 mg by mouth two (2) times a day. Refills:  0 FLINTSTONES COMPLETE (IRON) chewable tablet Generic drug:  flintstones complete Your last dose was: Your next dose is:    
   
   
 Dose:  1 Tab Take 1 Tab by mouth daily. Refills:  0  
     
   
   
   
  
 ibuprofen 800 mg tablet Commonly known as:  MOTRIN Your last dose was: Your next dose is:    
   
   
 Dose:  800 mg Take 1 Tab by mouth every six (6) hours as needed for Pain. Quantity:  30 Tab Refills:  1 STOP taking these medications HYDROcodone-acetaminophen 5-325 mg per tablet Commonly known as:  Nalini Guillaume Where to Get Your Medications Information on where to get these meds will be given to you by the nurse or doctor. ! Ask your nurse or doctor about these medications  
  oxyCODONE-acetaminophen 5-325 mg per tablet

## 2017-10-17 LAB
BASOPHILS # BLD: 0 K/UL (ref 0–0.1)
BASOPHILS NFR BLD: 0 % (ref 0–1)
EOSINOPHIL # BLD: 0 K/UL (ref 0–0.4)
EOSINOPHIL NFR BLD: 0 % (ref 0–7)
ERYTHROCYTE [DISTWIDTH] IN BLOOD BY AUTOMATED COUNT: 13.6 % (ref 11.5–14.5)
HCT VFR BLD AUTO: 32.8 % (ref 35–47)
HGB BLD-MCNC: 10.7 G/DL (ref 11.5–16)
LYMPHOCYTES # BLD: 1.4 K/UL (ref 0.8–3.5)
LYMPHOCYTES NFR BLD: 13 % (ref 12–49)
MCH RBC QN AUTO: 30.7 PG (ref 26–34)
MCHC RBC AUTO-ENTMCNC: 32.6 G/DL (ref 30–36.5)
MCV RBC AUTO: 94.3 FL (ref 80–99)
MONOCYTES # BLD: 0.7 K/UL (ref 0–1)
MONOCYTES NFR BLD: 6 % (ref 5–13)
NEUTS SEG # BLD: 9.2 K/UL (ref 1.8–8)
NEUTS SEG NFR BLD: 81 % (ref 32–75)
PLATELET # BLD AUTO: 175 K/UL (ref 150–400)
RBC # BLD AUTO: 3.48 M/UL (ref 3.8–5.2)
WBC # BLD AUTO: 11.3 K/UL (ref 3.6–11)

## 2017-10-17 PROCEDURE — 85025 COMPLETE CBC W/AUTO DIFF WBC: CPT | Performed by: OBSTETRICS & GYNECOLOGY

## 2017-10-17 PROCEDURE — 74011250637 HC RX REV CODE- 250/637: Performed by: OBSTETRICS & GYNECOLOGY

## 2017-10-17 PROCEDURE — 36415 COLL VENOUS BLD VENIPUNCTURE: CPT | Performed by: OBSTETRICS & GYNECOLOGY

## 2017-10-17 PROCEDURE — 65270000029 HC RM PRIVATE

## 2017-10-17 PROCEDURE — 74011250636 HC RX REV CODE- 250/636: Performed by: OBSTETRICS & GYNECOLOGY

## 2017-10-17 PROCEDURE — 74011250636 HC RX REV CODE- 250/636: Performed by: ANESTHESIOLOGY

## 2017-10-17 RX ORDER — OXYCODONE AND ACETAMINOPHEN 5; 325 MG/1; MG/1
2 TABLET ORAL
Status: DISCONTINUED | OUTPATIENT
Start: 2017-10-17 | End: 2017-10-17

## 2017-10-17 RX ORDER — OXYCODONE AND ACETAMINOPHEN 5; 325 MG/1; MG/1
1 TABLET ORAL
Status: DISCONTINUED | OUTPATIENT
Start: 2017-10-17 | End: 2017-10-18 | Stop reason: HOSPADM

## 2017-10-17 RX ORDER — DOCUSATE SODIUM 100 MG/1
100 CAPSULE, LIQUID FILLED ORAL DAILY
Status: DISCONTINUED | OUTPATIENT
Start: 2017-10-17 | End: 2017-10-18 | Stop reason: HOSPADM

## 2017-10-17 RX ORDER — OXYCODONE AND ACETAMINOPHEN 5; 325 MG/1; MG/1
2 TABLET ORAL
Status: DISCONTINUED | OUTPATIENT
Start: 2017-10-17 | End: 2017-10-18 | Stop reason: HOSPADM

## 2017-10-17 RX ADMIN — DOCUSATE SODIUM 100 MG: 100 CAPSULE, LIQUID FILLED ORAL at 17:27

## 2017-10-17 RX ADMIN — IBUPROFEN 800 MG: 800 TABLET ORAL at 10:13

## 2017-10-17 RX ADMIN — SODIUM CHLORIDE, SODIUM LACTATE, POTASSIUM CHLORIDE, AND CALCIUM CHLORIDE 125 ML/HR: 600; 310; 30; 20 INJECTION, SOLUTION INTRAVENOUS at 00:27

## 2017-10-17 RX ADMIN — OXYCODONE HYDROCHLORIDE AND ACETAMINOPHEN 1 TABLET: 5; 325 TABLET ORAL at 18:03

## 2017-10-17 RX ADMIN — IBUPROFEN 800 MG: 800 TABLET ORAL at 16:06

## 2017-10-17 RX ADMIN — KETOROLAC TROMETHAMINE 30 MG: 30 INJECTION, SOLUTION INTRAMUSCULAR at 02:16

## 2017-10-17 RX ADMIN — OXYCODONE HYDROCHLORIDE AND ACETAMINOPHEN 1 TABLET: 5; 325 TABLET ORAL at 23:17

## 2017-10-17 RX ADMIN — IBUPROFEN 800 MG: 800 TABLET ORAL at 23:17

## 2017-10-17 NOTE — ANESTHESIA POSTPROCEDURE EVALUATION
Post-Anesthesia Evaluation and Assessment    Patient: Jody Lancaster MRN: [id number]  SSN: [id number]    YOB: 1980  Age: 30 y.o. Sex: female       Cardiovascular Function/Vital Signs  Visit Vitals    /74 (BP 1 Location: Right arm, BP Patient Position: At rest)    Pulse 81    Temp 36.4 °C (97.6 °F)    Resp 16    Ht 5' 3\" (1.6 m)    Wt 91.6 kg (202 lb)    SpO2 99%    Breastfeeding Unknown    BMI 35.78 kg/m2       Patient is status post spinal anesthesia for Procedure(s):   SECTION. Nausea/Vomiting: None    Postoperative hydration reviewed and adequate. Pain:  Pain Scale 1: Numeric (0 - 10) (10/17/17 0730)  Pain Intensity 1: 0 (10/17/17 0730)   Managed    Neurological Status:   Neuro (WDL): Exceptions to WDL (10/16/17 1516)  Neuro  LLE Motor Response: Purposeful (10/16/17 2049)  RLE Motor Response: Purposeful (10/16/17 2049)   At baseline    Mental Status and Level of Consciousness: Arousable    Pulmonary Status:   O2 Device: Room air (10/17/17 0730)   Adequate oxygenation and airway patent    Complications related to anesthesia: None    Post-anesthesia assessment completed.  No concerns    Signed By: Jerzy Ceron MD     2017

## 2017-10-17 NOTE — ROUTINE PROCESS
Bedside shift change report given to [facility] (oncoming nurse) by Michael West RN ffgoing nurse). Report included the following information SBAR, OR Summary, Procedure Summary, Intake/Output, MAR and Recent Results.

## 2017-10-17 NOTE — LACTATION NOTE
Shelbi Garcia Lactation Consultant Signed  Progress Notes Date of Service: 10/17/17 114         Problem: Lactation Care Plan  Goal: *Infant latching appropriately  Outcome: Progressing Towards Goal  Pt will successfully establish breastfeeding by feeding in response to infant's early feeding cues and/or to offer breast every 2-3 hours. Ways to obtain a deep latch and seek comfortable positioning shared, aware to keep log of feedings/output. Goal: *Weight loss less than 10% of birth weight  Outcome: Progressing Towards Goal      Encouraged mom to attempt feeding with baby led feeding cues. Just as sucking on fingers, rooting, mouthing. Looking for 8-12 feedings in 24 hours. Don't limit baby at breast, allow baby to come of breast on it's own. Baby may want to feed  often and may increase number of feedings on second day of life. Skin to skin encouraged.        If baby doesn't nurse,  Mom should  hand express  10-20 drops of colostrum and drip into baby's mouth, or give to baby by finger feeding, cup feeding, or spoon feeding at least every 2-3 hours.      Problem: Patient Education: Go to Patient Education Activity  Goal: Patient/Family Education  Outcome: Progressing Towards Goal   Discussed what to do if nipples become sore. Care for sore/tender nipples discussed:  ways to improve positioning and latch practiced and discussed, hand express colostrum after feedings and let air dry, light application of lanolin, hydrogel pads, seek comfortable laid back feeding position, start feedings on least sore side first.     Comments:   Pt will successfully establish breastfeeding by feeding in response to early feeding cues   or wake every 3h, will obtain deep latch, and will keep log of feedings/output.   Taught to BF at hunger cues and or q 2-3 hrs and to offer 10-20 drops of hand expressed colostrum at any non-feeds.        Breast Assessment  Left Breast: Medium  Left Nipple: Everted, Intact  Right Breast: Medium  Right Nipple: Everted, Intact  Breast- Feeding Assessment  Attends Breast-Feeding Classes: No  Breast-Feeding Experience: Yes  Breast Trauma/Surgery: No  Type/Quality: Good  Lactation Consultant Visits  Breast-Feedings: Good  (Baby was latched on well and breastfeeding on left breast)  Mother/Infant Observation  Mother Observation: Alignment, Breast comfortable, Close hold, Holds breast, Nipple round on release, Recognizes feeding cues  Infant Observation: Audible swallows, Frenulum checked, Latches nipple and aereolae, Lips flanged, lower, Lips flanged, upper, Opens mouth, Rhythmic suck  LATCH Documentation  Latch: Grasps breast, tongue down, lips flanged, rhythmic sucking  Audible Swallowing: A few with stimulation  Type of Nipple: Everted (after stimulation)  Comfort (Breast/Nipple): Soft/non-tender  Hold (Positioning): No assist from staff, mother able to position/hold infant  LATCH Score: 9

## 2017-10-17 NOTE — PROGRESS NOTES
Bedside and Verbal shift change report given to Jermaine López RN (oncoming nurse) by Jd Hope RN (offgoing nurse). Report given with SBAR, Kardex, Intake/Output and MAR.

## 2017-10-17 NOTE — PROGRESS NOTES
Post-Operative Day Number 1 Progress Note    Patient doing well post-op day 1 from  delivery without significant complaints. Pain controlled on current medication. +flatus    Vitals:  Patient Vitals for the past 8 hrs:   BP Temp Pulse Resp   10/17/17 0812 112/74 97.6 °F (36.4 °C) 81 16   10/17/17 0309 127/74 97.9 °F (36.6 °C) 80 18     Temp (24hrs), Av.8 °F (36.6 °C), Min:97.6 °F (36.4 °C), Max:98 °F (36.7 °C)      Vital signs stable, afebrile. Exam:  Patient without distress. Abdomen soft, fundus firm at level of umbilicus, non tender. Incision dressed               Lower extremities are negative for swelling, cords or tenderness. Lab/Data Review: All lab results for the last 24 hours reviewed. Assessment and Plan:  Patient appears to be having uncomplicated post- course. Continue routine post-op care and maternal education.

## 2017-10-18 VITALS
HEART RATE: 76 BPM | WEIGHT: 202 LBS | RESPIRATION RATE: 16 BRPM | DIASTOLIC BLOOD PRESSURE: 76 MMHG | SYSTOLIC BLOOD PRESSURE: 109 MMHG | HEIGHT: 63 IN | BODY MASS INDEX: 35.79 KG/M2 | TEMPERATURE: 97.7 F | OXYGEN SATURATION: 99 %

## 2017-10-18 PROCEDURE — 74011250637 HC RX REV CODE- 250/637: Performed by: OBSTETRICS & GYNECOLOGY

## 2017-10-18 RX ORDER — OXYCODONE AND ACETAMINOPHEN 5; 325 MG/1; MG/1
1 TABLET ORAL
Qty: 30 TAB | Refills: 0 | Status: SHIPPED | OUTPATIENT
Start: 2017-10-18

## 2017-10-18 RX ADMIN — OXYCODONE HYDROCHLORIDE AND ACETAMINOPHEN 2 TABLET: 5; 325 TABLET ORAL at 04:21

## 2017-10-18 RX ADMIN — OXYCODONE HYDROCHLORIDE AND ACETAMINOPHEN 2 TABLET: 5; 325 TABLET ORAL at 12:38

## 2017-10-18 RX ADMIN — DOCUSATE SODIUM 100 MG: 100 CAPSULE, LIQUID FILLED ORAL at 08:29

## 2017-10-18 RX ADMIN — OXYCODONE HYDROCHLORIDE AND ACETAMINOPHEN 2 TABLET: 5; 325 TABLET ORAL at 08:30

## 2017-10-18 RX ADMIN — IBUPROFEN 800 MG: 800 TABLET ORAL at 08:29

## 2017-10-18 NOTE — LACTATION NOTE
Harvey Em Lactation Consultant Signed  Progress Notes Date of Service: 10/18/17 1040         Problem: Lactation Care Plan  Goal: *Infant latching appropriately  Outcome: Resolved/Met Date Met: 10/18/17  Pt will successfully establish breastfeeding by feeding in response to infant's early feeding cues and/or to offer breast every 2-3 hours. Ways to obtain a deep latch and seek comfortable positioning shared, aware to keep log of feedings/output. Goal: *Weight loss less than 10% of birth weight  Outcome: Resolved/Met Date Met: 10/18/17  Infant weight loss is -6.2%  Reviewed breastfeeding basics:  Supply and demand, breastfeed baby 8-12 times in 24 hr,  stomach size, early  Feeding cues, skin to skin, positioning and baby led latch-on, assymetrical latch with signs of good, deep latch vs shallow, feeding frequency and duration, and log sheet for tracking infant feedings and output. Breastfeeding Booklet and Warm line information given. Discussed typical  weight loss and the importance of infant weight checks with pediatrician 1-2 post discharge.     Problem: Patient Education: Go to Patient Education Activity  Goal: Patient/Family Education  Outcome: Resolved/Met Date Met: 10/18/17  Engorgement Care Guidelines:  Reviewed how milk is made and normal phases of milk production. Taught care of engorged breasts - frequent breastfeeding encouraged, cool packs and motrin as tolerated. Anticipatory guidance shared.      Care for sore/tender nipples discussed:  ways to improve positioning and latch practiced and discussed, hand express colostrum after feedings and let air dry, light application of lanolin, hydrogel pads, seek comfortable laid back feeding position, start feedings on least sore side first.     Discussed eating a healthy diet. Instructed mother to eat a variety of foods in order to get a well balanced diet.  She should consume an extra 500 calories per day (more than her non-pregnant requirement.) These extra calories will help provide energy needed for optimal breast milk production. Mother also encouraged to \"drink to thirst\" and it is recommended that she drink fluids such as water, fruit/vegetable juice. Nutritious snacks should be available so that she can eat throughout the day to help satisfy her hunger and maintain a good milk supply.     Chart shows numerous feedings, void, stool WNL. Discussed importance of monitoring outputs and feedings on first week of life. Discussed ways to tell if baby is  getting enough breast milk, ie  voids and stools, change in color of stool, and return to birth wt within 2 weeks. Follow up with pediatrician visit for weight check in 1-2 days (per AAP guidelines.)  Encouraged to call Warm Line  [phone] or [facility] at [phone] for any questions/problems that arise. Mother also given breastfeeding support group dates and times for any future needs     Comments: Pt will successfully establish breastfeeding by feeding in response to early feeding cues   or wake every 3h, will obtain deep latch, and will keep log of feedings/output. Taught to BF at hunger cues and or q 2-3 hrs and to offer 10-20 drops of hand expressed colostrum at any non-feeds.       Breast Assessment  Left Breast: Medium  Left Nipple: Everted, Intact  Right Breast: Medium  Right Nipple: Everted, Intact  Breast- Feeding Assessment  Attends Breast-Feeding Classes: No  Breast-Feeding Experience: Yes  Breast Trauma/Surgery: No  Type/Quality: Good  Lactation Consultant Visits  Breast-Feedings:  (Mother states she is weber in breast today and she cluster fed baby last night. She last breast fed baby at 0800 for 10 min.   She is getting ready for discharge)  Mother/Infant Observation  Mother Observation: Alignment, Breast comfortable, Close hold, Holds breast, Nipple round on release, Recognizes feeding cues  Infant Observation: Audible swallows, Frenulum checked, Latches nipple and aereolae, Lips flanged, lower, Lips flanged, upper, Opens mouth, Rhythmic suck

## 2017-10-18 NOTE — PROGRESS NOTES
Bedside and Verbal shift change report given to Evans Bonner RN (oncoming nurse) by Qing Daly RN (offgoing nurse). Report given with SBAR, Kardex, Intake/Output and MAR.

## 2017-10-18 NOTE — PROGRESS NOTES
PostPartum Note    Karol Dillon  [id number]  1980  30 y.o.    S:  Ms. Karol Dillon is a 30 y.o.  POD #2 s/p LTCS @ 37w5d. Doing well. She had a baby girl. Her lochia is like a period. She describes her pain as mild and is well controlled with PO medications. She is breast feeding and this is going well. She is ambulating and voiding. Tolerating PO intake. O:   Visit Vitals    /76 (BP 1 Location: Right arm, BP Patient Position: At rest)    Pulse 76    Temp 97.7 °F (36.5 °C)    Resp 16    Ht 5' 3\" (1.6 m)    Wt 91.6 kg (202 lb)    SpO2 99%    Breastfeeding Unknown    BMI 35.78 kg/m2       Lab Results   Component Value Date/Time    WBC 11.3 10/17/2017 02:44 AM    HGB 10.7 10/17/2017 02:44 AM    HCT 32.8 10/17/2017 02:44 AM    PLATELET 823  02:44 AM    MCV 94.3 10/17/2017 02:44 AM    Hgb, External 13.8 2017    Hct, External 42.1 2017    Platelet cnt., External 287 2013       Gen - No acute distress  Abdomen - Fundus firm, below the umbilicus; incision c/d/i    Ext - Warm, well perfused. Nontender    A/P:  POD #2 s/p LTCS @ 37w5d doing well. 1.  Routine PP instructions/ care discussed  2. Blood type - Rh +  3. Rubella imm  4. Circumcision n/a   5. Discharge today per patient request and is meeting criteria    6. F/U 4-6 weeks for PP check.       Betito Galeana MD  [facility]

## (undated) DEVICE — (D)PREP SKN CHLRAPRP APPL 26ML -- CONVERT TO ITEM 371833

## (undated) DEVICE — TELFA ADHESIVE ISLAND DRESSING: Brand: TELFA

## (undated) DEVICE — STERILE POLYISOPRENE POWDER-FREE SURGICAL GLOVES: Brand: PROTEXIS

## (undated) DEVICE — TIP CLEANER: Brand: VALLEYLAB

## (undated) DEVICE — SUTURE VCRL 3-0 L36IN ABSRB UD CT L40MM 1/2 CIR TAPERPOINT J956H

## (undated) DEVICE — C-SECTION II-LF: Brand: MEDLINE INDUSTRIES, INC.

## (undated) DEVICE — HANDLE LT SNAP ON ULT DURABLE LENS FOR TRUMPF ALC DISPOSABLE

## (undated) DEVICE — DRAPE C SECT OBSTETRICS/GYNECOLOGY

## (undated) DEVICE — SUTURE VCRL SZ 0 L36IN ABSRB VLT L40MM CT 1/2 CIR J358H

## (undated) DEVICE — TOWEL,OR,DSP,ST,BLUE,STD,2/PK,40PK/CS: Brand: MEDLINE

## (undated) DEVICE — SOLIDIFIER FLUID 3000 CC ABSORB

## (undated) DEVICE — ROCKER SWITCH PENCIL HOLSTER: Brand: VALLEYLAB

## (undated) DEVICE — BLADE SURG UNIV BLUE --

## (undated) DEVICE — BULB SYRINGE, IRRIGATION WITH PROTECTIVE CAP, 60 CC, INDIVIDUALLY WRAPPED: Brand: DOVER

## (undated) DEVICE — SUTURE 0 L36IN ABSRB BRN CT L40MM 1/2 CIR TAPERPOINT SGL 914H

## (undated) DEVICE — SOLUTION IV 1000ML 0.9% SOD CHL

## (undated) DEVICE — TRAY CATH OD16FR SIL URIN M STATLOK STBL DEV SURSTP

## (undated) DEVICE — SLEEVE COMPR STD 12 IN FOR 165IN CALF COMFORT VENODYNE SYS

## (undated) DEVICE — Z DUP USE 2227076 BARRIER ADH TISS 4-SECTION SEPRAFILM

## (undated) DEVICE — SUTURE VCRL SZ 3-0 L27IN ABSRB UD KS L60MM STR REV CUT NDL J663H

## (undated) DEVICE — REM POLYHESIVE ADULT PATIENT RETURN ELECTRODE: Brand: VALLEYLAB